# Patient Record
Sex: MALE
[De-identification: names, ages, dates, MRNs, and addresses within clinical notes are randomized per-mention and may not be internally consistent; named-entity substitution may affect disease eponyms.]

---

## 2024-05-25 ENCOUNTER — EXTERNAL RECORD (OUTPATIENT)
Dept: HEALTH INFORMATION MANAGEMENT | Facility: OTHER | Age: 64
End: 2024-05-25

## 2024-06-12 ENCOUNTER — OFFICE VISIT (OUTPATIENT)
Dept: FAMILY MEDICINE CLINIC | Facility: CLINIC | Age: 64
End: 2024-06-12
Payer: COMMERCIAL

## 2024-06-12 VITALS
HEIGHT: 71 IN | BODY MASS INDEX: 26.18 KG/M2 | OXYGEN SATURATION: 97 % | TEMPERATURE: 98 F | WEIGHT: 187 LBS | RESPIRATION RATE: 18 BRPM | SYSTOLIC BLOOD PRESSURE: 158 MMHG | HEART RATE: 92 BPM | DIASTOLIC BLOOD PRESSURE: 94 MMHG

## 2024-06-12 DIAGNOSIS — H10.13 ATOPIC CONJUNCTIVITIS OF BOTH EYES: Primary | ICD-10-CM

## 2024-06-12 DIAGNOSIS — I10 HYPERTENSION, UNSPECIFIED TYPE: ICD-10-CM

## 2024-06-12 PROCEDURE — 99213 OFFICE O/P EST LOW 20 MIN: CPT | Performed by: NURSE PRACTITIONER

## 2024-06-12 PROCEDURE — 3080F DIAST BP >= 90 MM HG: CPT | Performed by: NURSE PRACTITIONER

## 2024-06-12 PROCEDURE — 3008F BODY MASS INDEX DOCD: CPT | Performed by: NURSE PRACTITIONER

## 2024-06-12 PROCEDURE — 3077F SYST BP >= 140 MM HG: CPT | Performed by: NURSE PRACTITIONER

## 2024-06-12 NOTE — PROGRESS NOTES
CHIEF COMPLAINT:     Chief Complaint   Patient presents with    Eye Problem     X 3-4 days  Sx: both eye burning, discharge        HPI:   Nato Reyes is a 63 year old male who presents with chief complaint of both eyes burning. Symptoms began  3  days ago. Symptoms have been persisting since onset.   Patient reports bilateral eye redness, no eyelid crusting, white yellow discharge and eyelids sticking together in morning, + itching, no eyelid swelling, + tearing. Contact lens wearer: denies.   Nose runny at times. Denies fever, change in vision, sensitivity to light, pain with eye movement,foreign body sensation, eye injury, or contact with irritant.     Treatments tried: OTC red eyes with relief.   Previous eye surgery: denies      No current outpatient medications on file.      History reviewed. No pertinent past medical history.   History reviewed. No pertinent surgical history.   History reviewed. No pertinent family history.          REVIEW OF SYSTEMS:   GENERAL: feels well otherwise  SKIN: no rashes  EYES:denies blurred vision or double vision. See HPI  HENT: denies ear pain, congestion, sore throat  LUNGS: denies shortness of breath or cough  CARDIOVASCULAR: denies chest pain or palpitations   GI: denies N/V/C or abdominal pain  NEURO: denies headaches     EXAM:   BP (!) 158/94   Pulse 92   Temp 98.1 °F (36.7 °C)   Resp 18   Ht 5' 11\" (1.803 m)   Wt 187 lb (84.8 kg)   SpO2 97%   BMI 26.08 kg/m²   GENERAL: well developed, well nourished,in no apparent distress  SKIN: no rashes,no suspicious lesions  EYES: PERRLA, EOMI, normal optic disc; bilateral conjunctiva erythematous, injected, no discharge, no tearing,  no lid swelling.  No swelling or redness of periorbital tissue.  Vision  corrected - right eye : 20/30, left eye 20/30.   HENT: atraumatic, normocephalic,ears and throat are clear  NECK: supple, non tender  LUNGS: clear to auscultation bilaterally.   CARDIO: RRR without murmur  LYMPH: no  preauricular lymphadenopathy. No cervical lymphadenopathy          ASSESSMENT AND PLAN:   Nato Reyes is a 63 year old male who presents with:    ASSESSMENT:   Encounter Diagnoses   Name Primary?    Atopic conjunctivitis of both eyes Yes    Hypertension, unspecified type        PLAN:   Reviewed S/S allergy, viral, bacterial conjunctivitis. No indication for antibiotic eye drops at this time.   Discussed dry eyes, allergy. Pataday. Lubricating eye drops  Discussed HTN. Patient reports his PCP advised antihypertensive which he does not want to take. Discussed risks including MI and CVD. Patient V/U  Instructed to follow up with eye doctor for any new or worsening symptoms  The patient verbalizes understanding and is in agreement with treatment plan.